# Patient Record
Sex: MALE | Race: WHITE
[De-identification: names, ages, dates, MRNs, and addresses within clinical notes are randomized per-mention and may not be internally consistent; named-entity substitution may affect disease eponyms.]

---

## 2019-02-15 ENCOUNTER — HOSPITAL ENCOUNTER (EMERGENCY)
Dept: HOSPITAL 76 - ED | Age: 39
Discharge: HOME | End: 2019-02-15
Payer: COMMERCIAL

## 2019-02-15 VITALS — DIASTOLIC BLOOD PRESSURE: 75 MMHG | SYSTOLIC BLOOD PRESSURE: 144 MMHG

## 2019-02-15 DIAGNOSIS — H10.9: Primary | ICD-10-CM

## 2019-02-15 PROCEDURE — 99283 EMERGENCY DEPT VISIT LOW MDM: CPT

## 2019-02-15 NOTE — ED PHYSICIAN DOCUMENTATION
PD HPI OPHTHO





- Stated complaint


Stated Complaint: RED EYE





- Chief complaint


Chief Complaint: Heent





- History obtained from


History obtained from: Patient





- History of Present Illness


Timing - onset: How many days ago (2)


Timing - details: Gradual onset


Location: Right


Quality / character: Itching, Burning


Associated symptoms: FB sensation


Contributing factors: No: Recent URI, Wears glasses, Wears contacts


Similar symptoms before: Other (had left eye redness last week while in [place], seen at [facility] and was told it was some form of allergic 

conjunctivitis related to the climate, given rx for olopatadine. however, while 

the left eye has returned to normal, his right eye now has same symptoms (and 

this started after returning from [facility]))


Recently seen: Clinic





Review of Systems


Constitutional: reports: Reviewed and negative


Eyes: reports: Irritation.  denies: Loss of vision, Decreased vision, 

Photophobia, Discharge





PD PAST MEDICAL HISTORY





- Past Medical History


Past Medical History: No


Cardiovascular: None


Respiratory: None


Neuro: None


Endocrine/Autoimmune: None


GI: None


: None


HEENT: None


Psych: None


Musculoskeletal: None


Derm: None


Other Past Medical History: BILAT EYE SURGERY ...





- Past Surgical History


Past Surgical History: Yes


General: Other





- Present Medications


Home Medications: 


                                Ambulatory Orders











 Medication  Instructions  Recorded  Confirmed


 


No Known Home Medications  02/15/19 02/15/19














- Allergies


Allergies/Adverse Reactions: 


                                    Allergies











Allergy/AdvReac Type Severity Reaction Status Date / Time


 


No Known Drug Allergies Allergy   Verified 02/15/19 19:24














- Social History


Does the pt smoke?: No


Smoking Status: Never smoker


Does the pt drink ETOH?: Yes


Does the pt have substance abuse?: No





- Immunizations


Immunizations are current?: Yes





- POLST


Patient has POLST: No





PD ED PE NORMAL





- Vitals


Vital signs reviewed: Yes





- General


General: Alert and oriented X 3, No acute distress, Well developed/nourished





- HEENT


HEENT: PERRL, EOMI, Moist mucous membranes





PD ED PE EXPANDED





- Eyes


Eyes: Right eye, Injected conj/sclera





Results





- Vitals


Vitals: 


                                     Oxygen











O2 Source                      Room air

















PD MEDICAL DECISION MAKING





- ED course


Complexity details: considered differential, d/w patient





Departure





- Departure


Disposition: 01 Home, Self Care


Clinical Impression: 


 Conjunctivitis





Condition: Good


Instructions:  ED Conjunctivitis Nonspecific


Comments: 


Use the antibiotic drops as follows: 1 drop in right eye three times per day for

7 days. May stop on day 5 or 6 if the symptoms have completely resolved.


Discharge Date/Time: 02/15/19 22:55